# Patient Record
Sex: MALE | URBAN - METROPOLITAN AREA
[De-identification: names, ages, dates, MRNs, and addresses within clinical notes are randomized per-mention and may not be internally consistent; named-entity substitution may affect disease eponyms.]

---

## 2023-05-14 ENCOUNTER — NURSE TRIAGE (OUTPATIENT)
Dept: NURSING | Facility: CLINIC | Age: 28
End: 2023-05-14

## 2023-05-15 NOTE — TELEPHONE ENCOUNTER
"While working with a lisa screw it punctured his left hand, inbetween index finger and thumb. Injury happened just before this call.(30 min ago) Cleaned with isopropyl alcohol. It is bleeding: oozing. Bandaid applied. It was deep, pain=\"2\". Last tetanus vaccination 4.5 yrs ago.  PCP: [facility].     Triaged to a disposition of Home Care: given per guideline.     Yoli Fong RN Triage Nurse Advisor 9:22 PM 5/14/2023  Reason for Disposition    Minor puncture wound    Additional Information    Negative: [1] Puncture wound of head, neck, chest, back, or abdomen AND [2] sounds life-threatening to the triager    Negative: Shock suspected (e.g., cold/pale/clammy skin, too weak to stand, low BP, rapid pulse)    Negative: Sounds like a life-threatening emergency to the triager    Negative: [1] Caused by a needlestick or other sharp object AND [2] possible exposure to another person's body fluids    Negative: Caused by an animal bite    Negative: Caused by a human bite    Negative: Caused by a marine animal sting or bite    Negative: Skin is cut or scraped, not punctured    Negative: Puncture wound of eye or eyelid    Negative: Foreign body is still in the skin (e.g., splinter, sliver, fishhook)    Negative: [1] Puncture wound of head, neck, chest, abdomen, or overlying a joint AND [2] could be deep    Negative: High pressure injection injury (e.g., from grease gun or paint gun, usually work-related)    Negative: Sounds like a serious injury to the triager    Negative: [1] SEVERE pain AND [2] not improved 2 hours after pain medicine    Negative: [1] Puncture wound of foot AND [2] hurts too much to walk on it (i.e., unable to bear weight, severe limp)    Negative: [1] Puncture wound of bare foot (no shoes) AND [2] setting was dirty  (Exception: shallow puncture)    Negative: [1] Puncture wound of foot AND [2] puncture went through shoe (e.g., tennis shoe)    Negative: [1] Puncture wound of finger AND [2] entire finger " swollen    Negative: Puncture wound from a sharp object that was very dirty    Negative: [1] Dirt in the wound AND [2] not removed with 15 minutes of scrubbing    Negative: Sensation of something still in the wound (i.e., retained foreign body in wound)    Negative: Tip of the object is broken off and missing (e.g., pencil point)    Negative: [1] Red area or streak AND [2] fever    Negative: [1] Looks infected AND [2] large red area (> 1 in. or 2.5 cm)    Negative: [1] Looks infected (red area or pus) AND [2] no fever    Negative: [1] Pain or swelling AND [2] present > 5 days    Negative: No prior tetanus shots (or is not fully vaccinated)    Negative: [1] HIV positive or severe immunodeficiency (severely weak immune system) AND [2] DIRTY puncture (e.g., object OR skin was dirty, objects on ground/floor)    Negative: [1] Last tetanus shot > 5 years ago AND [2] DIRTY puncture (e.g., object OR skin was dirty, objects on ground/floor)    Negative: [1] Last tetanus shot > 10 years ago AND [2] CLEAN puncture (e.g., object AND skin were clean)    Negative: [1] Puncture wound of foot AND [2] diabetes mellitus    Protocols used: PUNCTURE WOUND-A-AH